# Patient Record
Sex: MALE
[De-identification: names, ages, dates, MRNs, and addresses within clinical notes are randomized per-mention and may not be internally consistent; named-entity substitution may affect disease eponyms.]

---

## 2020-01-01 ENCOUNTER — HOSPITAL ENCOUNTER (INPATIENT)
Dept: HOSPITAL 5 - ED | Age: 75
LOS: 3 days | Discharge: LEFT BEFORE DISCHARGE | DRG: 811 | End: 2020-01-04
Attending: HOSPITALIST | Admitting: INTERNAL MEDICINE
Payer: MEDICARE

## 2020-01-01 DIAGNOSIS — E11.9: ICD-10-CM

## 2020-01-01 DIAGNOSIS — Z91.041: ICD-10-CM

## 2020-01-01 DIAGNOSIS — Z53.29: ICD-10-CM

## 2020-01-01 DIAGNOSIS — Z79.84: ICD-10-CM

## 2020-01-01 DIAGNOSIS — Z79.82: ICD-10-CM

## 2020-01-01 DIAGNOSIS — I50.31: ICD-10-CM

## 2020-01-01 DIAGNOSIS — J90: ICD-10-CM

## 2020-01-01 DIAGNOSIS — D46.9: Primary | ICD-10-CM

## 2020-01-01 DIAGNOSIS — Z82.49: ICD-10-CM

## 2020-01-01 DIAGNOSIS — Z79.899: ICD-10-CM

## 2020-01-01 DIAGNOSIS — D61.818: ICD-10-CM

## 2020-01-01 DIAGNOSIS — F17.210: ICD-10-CM

## 2020-01-01 DIAGNOSIS — Z85.05: ICD-10-CM

## 2020-01-01 DIAGNOSIS — J44.9: ICD-10-CM

## 2020-01-01 DIAGNOSIS — Z79.51: ICD-10-CM

## 2020-01-01 DIAGNOSIS — Z88.5: ICD-10-CM

## 2020-01-01 DIAGNOSIS — K21.9: ICD-10-CM

## 2020-01-01 DIAGNOSIS — Z85.038: ICD-10-CM

## 2020-01-01 PROCEDURE — 93005 ELECTROCARDIOGRAM TRACING: CPT

## 2020-01-01 PROCEDURE — 93010 ELECTROCARDIOGRAM REPORT: CPT

## 2020-01-01 PROCEDURE — 82550 ASSAY OF CK (CPK): CPT

## 2020-01-01 PROCEDURE — 36430 TRANSFUSION BLD/BLD COMPNT: CPT

## 2020-01-01 PROCEDURE — 85025 COMPLETE CBC W/AUTO DIFF WBC: CPT

## 2020-01-01 PROCEDURE — 86900 BLOOD TYPING SEROLOGIC ABO: CPT

## 2020-01-01 PROCEDURE — 85730 THROMBOPLASTIN TIME PARTIAL: CPT

## 2020-01-01 PROCEDURE — 36415 COLL VENOUS BLD VENIPUNCTURE: CPT

## 2020-01-01 PROCEDURE — 82553 CREATINE MB FRACTION: CPT

## 2020-01-01 PROCEDURE — 85007 BL SMEAR W/DIFF WBC COUNT: CPT

## 2020-01-01 PROCEDURE — P9016 RBC LEUKOCYTES REDUCED: HCPCS

## 2020-01-01 PROCEDURE — 80061 LIPID PANEL: CPT

## 2020-01-01 PROCEDURE — 85610 PROTHROMBIN TIME: CPT

## 2020-01-01 PROCEDURE — 83880 ASSAY OF NATRIURETIC PEPTIDE: CPT

## 2020-01-01 PROCEDURE — 80048 BASIC METABOLIC PNL TOTAL CA: CPT

## 2020-01-01 PROCEDURE — 94760 N-INVAS EAR/PLS OXIMETRY 1: CPT

## 2020-01-01 PROCEDURE — 86850 RBC ANTIBODY SCREEN: CPT

## 2020-01-01 PROCEDURE — 94640 AIRWAY INHALATION TREATMENT: CPT

## 2020-01-01 PROCEDURE — 84484 ASSAY OF TROPONIN QUANT: CPT

## 2020-01-01 PROCEDURE — 85027 COMPLETE CBC AUTOMATED: CPT

## 2020-01-01 PROCEDURE — 85379 FIBRIN DEGRADATION QUANT: CPT

## 2020-01-01 PROCEDURE — 86920 COMPATIBILITY TEST SPIN: CPT

## 2020-01-01 PROCEDURE — 86901 BLOOD TYPING SEROLOGIC RH(D): CPT

## 2020-01-01 PROCEDURE — 71045 X-RAY EXAM CHEST 1 VIEW: CPT

## 2020-01-02 LAB
%HYPO/RBC NFR BLD AUTO: (no result) %
ANISOCYTOSIS BLD QL SMEAR: (no result)
APTT BLD: 25.8 SEC. (ref 24.2–36.6)
BAND NEUTROPHILS # (MANUAL): 0 K/MM3
BUN SERPL-MCNC: 28 MG/DL (ref 9–20)
BUN/CREAT SERPL: 20 %
CALCIUM SERPL-MCNC: 8.8 MG/DL (ref 8.4–10.2)
HCT VFR BLD CALC: 16.3 % (ref 35.5–45.6)
HDLC SERPL-MCNC: 44 MG/DL (ref 40–59)
HEMOLYSIS INDEX: 3
HGB BLD-MCNC: 5.4 GM/DL (ref 11.8–15.2)
INR PPP: 1.15 (ref 0.87–1.13)
MCHC RBC AUTO-ENTMCNC: 33 % (ref 32–34)
MCV RBC AUTO: 94 FL (ref 84–94)
MYELOCYTES # (MANUAL): 0 K/MM3
PLATELET # BLD: 67 K/MM3 (ref 140–440)
PROMYELOCYTES # (MANUAL): 0 K/MM3
RBC # BLD AUTO: 1.74 M/MM3 (ref 3.65–5.03)
TOTAL CELLS COUNTED BLD: 100

## 2020-01-02 PROCEDURE — 5A09357 ASSISTANCE WITH RESPIRATORY VENTILATION, LESS THAN 24 CONSECUTIVE HOURS, CONTINUOUS POSITIVE AIRWAY PRESSURE: ICD-10-PCS | Performed by: HOSPITALIST

## 2020-01-02 PROCEDURE — 30233N1 TRANSFUSION OF NONAUTOLOGOUS RED BLOOD CELLS INTO PERIPHERAL VEIN, PERCUTANEOUS APPROACH: ICD-10-PCS | Performed by: HOSPITALIST

## 2020-01-02 RX ADMIN — IPRATROPIUM BROMIDE AND ALBUTEROL SULFATE SCH AMPUL: .5; 3 SOLUTION RESPIRATORY (INHALATION) at 20:31

## 2020-01-02 RX ADMIN — FUROSEMIDE SCH MG: 10 INJECTION, SOLUTION INTRAVENOUS at 09:20

## 2020-01-02 RX ADMIN — Medication SCH ML: at 09:20

## 2020-01-02 RX ADMIN — IPRATROPIUM BROMIDE AND ALBUTEROL SULFATE SCH AMPUL: .5; 3 SOLUTION RESPIRATORY (INHALATION) at 13:38

## 2020-01-02 NOTE — EMERGENCY DEPARTMENT REPORT
ED Shortness of Breath HPI





- General


Chief Complaint: Dyspnea/Respdistress


Stated Complaint: SEAN


Time Seen by Provider: 01/01/20 23:51


Source: patient, EMS


Mode of arrival: Stretcher


Limitations: No Limitations





- History of Present Illness


Initial Comments: 





Patient is a 74-year-old  male with past medical history of 

myelodysplastic syndrome who was recently admitted to the hospital approximately

a month ago for anemia or shortness of breath.  Patient states that he's had 

several episodes where his shortness of breath his laboratories had come to the 

hospital.  Patient denies cough fever and congestion.  Patient states it is 

mostly sensation which improve with supplemental oxygen.  Patient was given neb 

treatment and started on CPAP in route secondary to hypoxia.  Patient states he 

is compliant with his Lasix as he does have episodes of pulmonary edema and 

pleural effusions.  Patient is a blood transfusion on his last hospitalization. 

Patient start chemotherapy next week.





Patient was 82% on 2 L when ems arrived





-: Gradual, This evening


Improves With: oxygen, bronchodilators





- Related Data


                                Home Medications











 Medication  Instructions  Recorded  Confirmed  Last Taken


 


Aspirin EC [Halfprin EC] 81 mg PO QDAY 05/05/16 12/02/19 10 Days Ago





    ~12/17/18


 


Pravastatin Sodium [Pravastatin] 20 mg PO QHS 05/05/16 12/04/19 12/27/18 04:30


 


amLODIPine 5 mg PO DAILY 05/05/16 12/02/19 12/27/18 04:30


 


ALBUTEROL Inhaler(NF) [VENTOLIN 2 puff IH QDAY PRN 12/18/18 12/02/19 Unknown





Inhaler(NF)]    


 


Fluticasone [Flonase] 1 spray NS QDAY 12/18/18 12/04/19 12/26/18


 


Glucosam-Chondro-Herb 149-Hyal 1,500 mg PO BID 12/18/18 12/02/19 10 Days Ago





[Glucosamine-Chondr Complex Tab]    ~12/17/18


 


Multivit-Min/FA/Lycopen/Lutein 1 each PO DAILY 12/18/18 12/04/19 10 Days Ago





[Centrum Silver Tablet]    ~12/17/18


 


Omega-3 Fatty Acids/Fish Oil [Fish 1 each PO DAILY 12/18/18 12/04/19 10 Days Ago





Oil 1,000 mg Capsule]    ~12/17/18


 


Umeclidinium Brm/Vilanterol Tr 1 each IH DAILY 12/18/18 12/04/19 12/27/18 04:30





[Anoro Ellipta 62.5-25 Mcg INH]    


 


Metoprolol Succinate [Toprol Xl] 100 mg PO DAILY 12/02/19 12/04/19 Unknown


 


Multivit-Min/FA/Lycopen/Lutein 1 each PO DAILY 12/02/19 12/04/19 Unknown





[Centrum Silver Tablet]    











                                    Allergies











Allergy/AdvReac Type Severity Reaction Status Date / Time


 


morphine AdvReac  HALLUCINATI Verified 12/18/18 16:36





   ONS  


 


IVP DYE AdvReac  Hives Uncoded 12/18/18 16:36














ED Review of Systems


ROS: 


Stated complaint: SEAN


Other details as noted in HPI





Comment: All other systems reviewed and negative





ED Past Medical Hx





- Past Medical History


Previous Medical History?: Yes


Hx Hypertension: Yes (took antihypertensives this morning)


Hx Heart Attack/AMI: No


Hx Diabetes: Yes (NO MEDS)


Hx GERD: Yes


Hx Liver Disease: Yes (remote hx colon cancer with liver met s/p lobectomy)


Hx Renal Disease: No


Hx Seizures: No


Hx Kidney Stones: Yes


Hx COPD: Yes (daily anoro and prn albuterol)


Hx HIV: No





- Social History


Smoking Status: Never Smoker





- Medications


Home Medications: 


                                Home Medications











 Medication  Instructions  Recorded  Confirmed  Last Taken  Type


 


Aspirin EC [Halfprin EC] 81 mg PO QDAY 05/05/16 12/02/19 10 Days Ago History





    ~12/17/18 


 


Pravastatin Sodium [Pravastatin] 20 mg PO QHS 05/05/16 12/04/19 12/27/18 04:30 

History


 


amLODIPine 5 mg PO DAILY 05/05/16 12/02/19 12/27/18 04:30 History


 


ALBUTEROL Inhaler(NF) [VENTOLIN 2 puff IH QDAY PRN 12/18/18 12/02/19 Unknown 

History





Inhaler(NF)]     


 


Fluticasone [Flonase] 1 spray NS QDAY 12/18/18 12/04/19 12/26/18 History


 


Glucosam-Chondro-Herb 149-Hyal 1,500 mg PO BID 12/18/18 12/02/19 10 Days Ago 

History





[Glucosamine-Chondr Complex Tab]    ~12/17/18 


 


Multivit-Min/FA/Lycopen/Lutein 1 each PO DAILY 12/18/18 12/04/19 10 Days Ago 

History





[Centrum Silver Tablet]    ~12/17/18 


 


Omega-3 Fatty Acids/Fish Oil [Fish 1 each PO DAILY 12/18/18 12/04/19 10 Days Ago

 History





Oil 1,000 mg Capsule]    ~12/17/18 


 


Umeclidinium Brm/Vilanterol Tr 1 each IH DAILY 12/18/18 12/04/19 12/27/18 04:30 

History





[Anoro Ellipta 62.5-25 Mcg INH]     


 


Metoprolol Succinate [Toprol Xl] 100 mg PO DAILY 12/02/19 12/04/19 Unknown 

History


 


Multivit-Min/FA/Lycopen/Lutein 1 each PO DAILY 12/02/19 12/04/19 Unknown History





[Centrum Silver Tablet]     














ED Physical Exam





- General


Limitations: No Limitations


General appearance: alert, in no apparent distress





- Head


Head exam: Present: atraumatic, normocephalic





- Eye


Eye exam: Present: normal appearance, PERRL, EOMI





- ENT


ENT exam: Present: mucous membranes moist





- Neck


Neck exam: Present: normal inspection





- Respiratory


Respiratory exam: Present: wheezes (mild), prolonged expiratory, other (states 

now she's had treatment and is on the Ventimask and feels much improved).  

Absent: normal lung sounds bilaterally, respiratory distress





- Cardiovascular


Cardiovascular Exam: Present: regular rate, normal rhythm, normal heart sounds. 

Absent: systolic murmur, diastolic murmur, rubs, gallop





- GI/Abdominal


GI/Abdominal exam: Present: soft, normal bowel sounds.  Absent: distended, 

tenderness, guarding





- Rectal


Rectal exam: Present: deferred





- Extremities Exam


Extremities exam: Present: normal inspection





- Back Exam


Back exam: Present: normal inspection





- Neurological Exam


Neurological exam: Present: alert, oriented X3





- Psychiatric


Psychiatric exam: Present: normal affect, normal mood





- Skin


Skin exam: Present: warm, dry, intact, normal color.  Absent: rash





ED Course


                                   Vital Signs











  01/01/20





  23:04


 


Temperature 98.2 F


 


Respiratory 22





Rate 


 


O2 Sat by Pulse 98





Oximetry 














ED Medical Decision Making





- Lab Data


Result diagrams: 


                                 01/02/20 00:17





                                 01/02/20 00:17








                                   Lab Results











  01/02/20 01/02/20 01/02/20 Range/Units





  00:17 00:17 00:17 


 


WBC  1.8 L*    (4.5-11.0)  K/mm3


 


RBC  1.74 L    (3.65-5.03)  M/mm3


 


Hgb  5.4 L*    (11.8-15.2)  gm/dl


 


Hct  16.3 L*    (35.5-45.6)  %


 


MCV  94    (84-94)  fl


 


MCH  31    (28-32)  pg


 


MCHC  33    (32-34)  %


 


RDW  23.8 H    (13.2-15.2)  %


 


Plt Count  67 L    (140-440)  K/mm3


 


PT   14.9   (12.2-14.9)  Sec.


 


INR   1.15 H   (0.87-1.13)  


 


APTT   25.8   (24.2-36.6)  Sec.


 


Sodium    140  (137-145)  mmol/L


 


Potassium    3.8  (3.6-5.0)  mmol/L


 


Chloride    100.9  ()  mmol/L


 


Carbon Dioxide    24  (22-30)  mmol/L


 


Anion Gap    19  mmol/L


 


BUN    28 H  (9-20)  mg/dL


 


Creatinine    1.4  (0.8-1.5)  mg/dL


 


Estimated GFR    50  ml/min


 


BUN/Creatinine Ratio    20  %


 


Glucose    183 H  ()  mg/dL


 


Calcium    8.8  (8.4-10.2)  mg/dL


 


NT-Pro-B Natriuret Pep    1658 H  (0-900)  pg/mL














- Radiology Data





CHEST 1 VIEW





INDICATION:


respiratiry distress





COMPARISON:


12/2/2019





FINDINGS:





Support devices: Left PICC line is again in position, but its distal end cannot 

be identified on


this image.





Heart: Normal and unchanged





Lungs/Pleura: Interstitial disease persists unchanged and is probably chronic. 

However, there now


appear to have developed small bilateral pleural effusions.








IMPRESSION:


1. Chronic lung disease. Interval development in the past month of small 

bilateral pleural


effusions.





Signer Name: Jose A Vallejo MD


Signed: 1/2/2020 12:10 AM


Workstation Name: [id number]





- Medical Decision Making





She was hypoxic at home is improved after neb treatment and being placed on a 

Ventimask.  Patient's satting 100% currently.  Patient does have worsening 

anemia and his hemoglobin dropped to 5.4.  Blood transfusion and alert.  Patient

to be admitted to the hospitalist service for further management\


Critical care attestation.: 


If time is entered above; I have spent that time in minutes in the direct care 

of this critically ill patient, excluding procedure time.








ED Disposition


Clinical Impression: 


 Myelodysplastic syndrome, Pancytopenia, Anemia requiring transfusions, 

Symptomatic anemia





Disposition: DC-09 OP ADMIT IP TO THIS HOSP


Is pt being admited?: Yes


Does the pt Need Aspirin: No


Condition: Stable


Time of Disposition: 01:34

## 2020-01-02 NOTE — CONSULTATION
History of Present Illness


Consult date: 01/02/20


Requesting physician: ROXY RECINOS


Consult reason: shortness of breath


History of present illness: 


The patient is followed by Dr. Izaguirre in our office.  He has a history of 

myelodysplastic syndrome.  Yesterday evening, he developed progressive dyspnea. 

H claims that he has had some leg edema as well.  Due to worsening of symptoms, 

he presented to the emergency department where he was noted to have a hemoglobin

of 5.4.  Previously, he was scheduled to see his hematologist, Dr. Beltran, in 

routine follow up this morning.  CXR revealed small bilateral pleural effusions.





Past History


Past Medical History: cancer (h/o Colon CA with liver mets), COPD, diabetes, 

hypertension, hyperlipidemia, other (MDS)


Past Surgical History: Other (hepatic resection)


Social history: denies: smoking, alcohol abuse


Family history: denies: CAD





Medications and Allergies


                                    Allergies











Allergy/AdvReac Type Severity Reaction Status Date / Time


 


morphine AdvReac  HALLUCINATI Verified 12/18/18 16:36





   ONS  


 


IVP DYE AdvReac  Hives Uncoded 12/18/18 16:36











                                Home Medications











 Medication  Instructions  Recorded  Confirmed  Last Taken  Type


 


Aspirin EC [Halfprin EC] 81 mg PO QDAY 05/05/16 01/02/20 10 Days Ago History





    ~12/17/18 


 


amLODIPine 5 mg PO DAILY 05/05/16 01/02/20 01/01/20 10:00 History


 


ALBUTEROL Inhaler(NF) [VENTOLIN 2 puff IH QDAY PRN 12/18/18 01/02/20 Unknown 

History





Inhaler(NF)]     


 


Fluticasone [Flonase] 1 spray NS QDAY 12/18/18 01/02/20 01/01/20 10:00 History


 


Glucosam-Chondro-Herb 149-Hyal 1,500 mg PO BID 12/18/18 01/02/20 10 Days Ago 

History





[Glucosamine-Chondr Complex Tab]    ~12/17/18 


 


Omega-3 Fatty Acids/Fish Oil [Fish 1 each PO DAILY 12/18/18 01/02/20 01/01/20 

10:00 History





Oil 1,000 mg Capsule]     


 


Umeclidinium Brm/Vilanterol Tr 1 each IH DAILY 12/18/18 01/02/20 01/01/20 10:00 

History





[Anoro Ellipta 62.5-25 Mcg INH]     


 


Metoprolol Succinate [Toprol Xl] 100 mg PO DAILY 12/02/19 01/02/20 01/01/20 

10:00 History


 


Multivit-Min/FA/Lycopen/Lutein 1 each PO DAILY 12/02/19 01/02/20 Unknown History





[Centrum Silver Tablet]     


 


Cyanocobalamin (Vitamin B-12) 1,000 mcg IM 4XW 01/02/20 01/02/20 12/25/19 10:00 

History





[Vitamin B-12]     


 


Furosemide [Lasix] 20 mg PO QDAY 01/02/20 01/02/20 01/01/20 10:00 History


 


Pantoprazole [Protonix] 40 mg PO QDAY 01/02/20 01/02/20 01/01/20 10:00 History


 


oxyCODONE /ACETAMINOPHEN [Percocet 1 tab PO Q6HR PRN 01/02/20 01/02/20 Unknown 

History





5/325]     











Active Meds: 


Active Medications





Acetaminophen (Tylenol)  650 mg PO Q4H PRN


   PRN Reason: Pain MILD(1-3)/Fever >100.5/HA


Albuterol (Proventil)  2.5 mg IH Q6HRT PRN


   PRN Reason: Shortness Of Breath


Albuterol/Ipratropium (Duoneb *Not For Prn Use*)  1 ampul IH Q6HRT [facility]


Amlodipine Besylate (Amlodipine)  5 mg PO DAILY [facility]


Fish Oil (Fish Oil)  1,000 mg PO DAILY [facility]


Fluticasone Propionate (Flonase)  50 mcg NS QDAY [facility]


Furosemide (Lasix)  20 mg IV QDAY [facility]


   Last Admin: 01/02/20 09:20 Dose:  20 mg


   Documented by: 


Metoprolol Succinate (Metoprolol Xl)  100 mg PO DAILY [facility]


Miscellaneous Medication (Umeclidinium Brm/Vilanterol Tr [Anoro Ellipta 62.5-25 

Mcg Inh])  1 each IH DAILY [facility]


Ondansetron HCl (Zofran)  4 mg IV Q8H PRN


   PRN Reason: Nausea And Vomiting


Pravastatin Sodium (Pravachol)  20 mg PO QHS [facility]


Sodium Chloride (Sodium Chloride Flush Syringe 10 Ml)  10 ml IV BID [facility]


   Last Admin: 01/02/20 09:20 Dose:  10 ml


   Documented by: 


Sodium Chloride (Sodium Chloride Flush Syringe 10 Ml)  10 ml IV PRN PRN


   PRN Reason: LINE FLUSH











Review of Systems


Constitutional: no fever, no chills


Ears, nose, mouth and throat: no ear pain, no ear discharge, no sore throat


Cardiovascular: shortness of breath, dyspnea on exertion, leg edema, no chest 

pain, no orthopnea


Respiratory: shortness of breath, dyspnea on exertion, no cough, no hemoptysis


Gastrointestinal: no abdominal pain, no nausea, no vomiting, no diarrhea, no 

constipation


Genitourinary Male: no dysuria, no urinary frequency


Rectal: no pain, no bleeding


Musculoskeletal: no neck stiffness, no neck pain, no myalgias


Integumentary: no rash, no pruritis


Neurological: no weakness, no parathesias, no headaches


Endocrine: no cold intolerance, no heat intolerance


Hematologic/Lymphatic: no easy bruising, no easy bleeding


Allergic/Immunologic: no urticaria, no angioedema





Physical Examination


                                   Vital Signs





                                Last Vital Signs











Temp  98.4 F   01/02/20 11:10


 


Pulse  81   01/02/20 11:10


 


Resp  20   01/02/20 11:10


 


BP  118/49   01/02/20 11:10


 


Pulse Ox  100   01/02/20 11:10














General appearance: no acute distress


HEENT: Positive: EOMI, Normocephaly, Mucus Membranes Moist


Neck: Positive: neck supple, trachea midline


Cardiac: Positive: Reg Rate and Rhythm, S1/S2


Lungs: Positive: clear to auscultation


Neuro: Positive: Grossly Intact


Abdomen: Positive: Soft, Active Bowel Sounds.  Negative: Tender


Skin: Positive: Clear.  Negative: Rash


Musculoskeletal: Normal Range of Motion


Extremities: Present: normal.  Absent: edema





Results





                                 01/02/20 00:17





                                 01/02/20 00:17


                                   Coagulation











  01/02/20 Range/Units





  00:17 


 


PT  14.9  (12.2-14.9)  Sec.


 


INR  1.15 H  (0.87-1.13)  


 


APTT  25.8  (24.2-36.6)  Sec.








                                       CBC











  01/02/20 Range/Units





  00:17 


 


WBC  1.8 L*  (4.5-11.0)  K/mm3


 


RBC  1.74 L  (3.65-5.03)  M/mm3


 


Hgb  5.4 L*  (11.8-15.2)  gm/dl


 


Hct  16.3 L*  (35.5-45.6)  %


 


Plt Count  67 L  (140-440)  K/mm3








                          Comprehensive Metabolic Panel











  01/02/20 Range/Units





  00:17 


 


Sodium  140  (137-145)  mmol/L


 


Potassium  3.8  (3.6-5.0)  mmol/L


 


Chloride  100.9  ()  mmol/L


 


Carbon Dioxide  24  (22-30)  mmol/L


 


BUN  28 H  (9-20)  mg/dL


 


Creatinine  1.4  (0.8-1.5)  mg/dL


 


Glucose  183 H  ()  mg/dL


 


Calcium  8.8  (8.4-10.2)  mg/dL














- Imaging and Cardiology


EKG: pending





Assessment and Plan


Intravenous diuretics and PRBC transfusion as you're doing.  He probably 

developed high output heart failure secondary to severe anemia.








- Patient Problems


(1) Symptomatic anemia


Current Visit: Yes   Status: Acute   





(2) Acute heart failure with preserved ejection fraction (HFpEF)


Current Visit: Yes   Status: Acute   





(3) Pancytopenia


Current Visit: Yes   Status: Acute   





(4) Myelodysplastic syndrome


Current Visit: Yes   Status: Chronic   





(5) COPD (chronic obstructive pulmonary disease)


Current Visit: Yes   Status: Chronic   





(6) Hypertension


Current Visit: Yes   Status: Chronic   


Qualifiers: 


   Hypertension type: essential hypertension   Qualified Code(s): I10 - 

Essential (primary) hypertension   





(7) Diabetes mellitus


Current Visit: Yes   Status: Chronic   


Qualifiers: 


   Diabetes mellitus type: type 2

## 2020-01-02 NOTE — XRAY REPORT
CHEST 1 VIEW



INDICATION: 

respiratiry distress



COMPARISON: 

12/2/2019



FINDINGS:



Support devices: Left PICC line is again in position, but its distal end cannot be identified on this
 image.



Heart: Normal and unchanged 



Lungs/Pleura: Interstitial disease persists unchanged and is probably chronic. However, there now wai
ear to have developed small bilateral pleural effusions.  





IMPRESSION:

1. Chronic lung disease. Interval development in the past month of small bilateral pleural effusions.




Signer Name: Jose A Vallejo MD 

Signed: 1/2/2020 12:10 AM

 Workstation Name: [id number]

## 2020-01-02 NOTE — EVENT NOTE
Date: 01/02/20


Patient seen and examined reports mild improvement close to his baseline.  He 

would rather go home but he is knows that he still short of breath.  He reports 

that he has had decreased exercise tolerance in the past few months.  He is 

agreeable to be monitored overnight and if continues to be stable and at his 

home dose of oxygen therapy he can be discharged.


He thinks that he is supposed to be on Lasix 40 mg at home but apparently has 

only been taking 20 mg he will follow-up with Dr. Jaffe and Dr. Contreras's nephrology and also pulmonologist respectively for reconciliation of 

this medicines.

## 2020-01-02 NOTE — HISTORY AND PHYSICAL REPORT
History of Present Illness


History of present illness: 


74-year-old man with a history of myelodysplastic syndrome diagnosed 3 months 

ago, COPD, hypertension, GERD, liver disease, colon cancer with mets comes 

emergency room with complaints of shortness of breath that started tonight.  

Also complains of fatigue, generalized pain which is chronic.  The patient has 

an appointment tomorrow to meet with his oncologist to discuss treatment for 

myelodysplastic syndrome.  Since he was diagnosed he is been transfused twice 

for anemia.  He has chronic lower extremity srinivas managed on Lasix ,patient is 

being admitted to be transfused


Review of systems


Constitutional: no  weight loss, chills, fever


Ears, eyes, nose, mouth and throat: no nasal congestion, no nasal discharge, no 

sinus pressure, no vision change, no red eye.


Neck: No neck pain or rigidity.


Cardiovascular: no chest pain, palpitations


Respiratory: no cough


Gastrointestinal: no hematochezia, abdominal pain


Genitourinary : no  frequency , no hematuria


Musculoskeletal: no joint swelling or muscle ache 


Integumentary: no rash, no pruritis


Neurological: no parathesias, no numbness, no focal weakness


Endocrine: no cold or heat intolerance, no polyuria or polydipsia


Hematologic/Lymphatic: no easy bruising, no easy bleeding, no gland swelling


Allergic/Immunologic: no urticaria, no angioedema.





PAST MEDICAL HISTORY: Hypertension, myelodysplastic syndrome diagnosed 3 months 

ago, COPD, hypertension, GERD, liver disease, colon cancer with mets 





PAST SURGICAL HISTORY: Part of the colon removed with right lobe of the liver





SOCIAL HISTORY: No alcohol, no drugs, smoke 1 pack a day





FAMILY HISTORY: Hypertension








Medications and Allergies


                                    Allergies











Allergy/AdvReac Type Severity Reaction Status Date / Time


 


morphine AdvReac  HALLUCINATI Verified 12/18/18 16:36





   ONS  


 


IVP DYE AdvReac  Hives Uncoded 12/18/18 16:36











                                Home Medications











 Medication  Instructions  Recorded  Confirmed  Last Taken  Type


 


Aspirin EC [Halfprin EC] 81 mg PO QDAY 05/05/16 01/02/20 10 Days Ago History





    ~12/17/18 


 


amLODIPine 5 mg PO DAILY 05/05/16 01/02/20 01/01/20 10:00 History


 


ALBUTEROL Inhaler(NF) [VENTOLIN 2 puff IH QDAY PRN 12/18/18 01/02/20 Unknown 

History





Inhaler(NF)]     


 


Fluticasone [Flonase] 1 spray NS QDAY 12/18/18 01/02/20 01/01/20 10:00 History


 


Glucosam-Chondro-Herb 149-Hyal 1,500 mg PO BID 12/18/18 01/02/20 10 Days Ago 

History





[Glucosamine-Chondr Complex Tab]    ~12/17/18 


 


Omega-3 Fatty Acids/Fish Oil [Fish 1 each PO DAILY 12/18/18 01/02/20 01/01/20 

10:00 History





Oil 1,000 mg Capsule]     


 


Umeclidinium Brm/Vilanterol Tr 1 each IH DAILY 12/18/18 01/02/20 01/01/20 10:00 

History





[Anoro Ellipta 62.5-25 Mcg INH]     


 


Metoprolol Succinate [Toprol Xl] 100 mg PO DAILY 12/02/19 01/02/20 01/01/20 

10:00 History


 


Multivit-Min/FA/Lycopen/Lutein 1 each PO DAILY 12/02/19 01/02/20 Unknown History





[Centrum Silver Tablet]     


 


Cyanocobalamin (Vitamin B-12) 1,000 mcg IM 4XW 01/02/20 01/02/20 12/25/19 10:00 

History





[Vitamin B-12]     


 


Furosemide [Lasix] 20 mg PO QDAY 01/02/20 01/02/20 01/01/20 10:00 History


 


Pantoprazole [Protonix] 40 mg PO QDAY 01/02/20 01/02/20 01/01/20 10:00 History


 


oxyCODONE /ACETAMINOPHEN [Percocet 1 tab PO Q6HR PRN 01/02/20 01/02/20 Unknown 

History





5/325]     











Active Meds: 


Active Medications





Acetaminophen (Tylenol)  650 mg PO Q4H PRN


   PRN Reason: Pain MILD(1-3)/Fever >100.5/HA


Sodium Chloride (Nacl 0.9% 500 Ml)  500 mls @ 0 mls/hr IV ONCE ONE


   Stop: 01/02/20 02:40


Ondansetron HCl (Zofran)  4 mg IV Q8H PRN


   PRN Reason: Nausea And Vomiting


Sodium Chloride (Sodium Chloride Flush Syringe 10 Ml)  10 ml IV BID [facility]US


Sodium Chloride (Sodium Chloride Flush Syringe 10 Ml)  10 ml IV PRN PRN


   PRN Reason: LINE FLUSH











Exam





- Physical Exam


Narrative exam: 


General Apperance: The patient lying in bed,  breathing comfortable 


HEENT: Normocephalic, atraumatic.  Pupils equally round and reactive to light, 

EOMI, no sclericterus or JVD or thyromegaly or nodule.  , no carotid bruit, 

mucous membranes moist, no exudate or erythema


Heart: S1-S2, regular is rhythm


Lungs: Decrease breath sound at the bases bilaterally, breathing comfortable


Abdomen: Positive bowel sounds, soft, nontender, nondistended, no organomegaly


Extremities:+ edema, no  cyanosis clubbing


Skin: no rash, nodule, warm and dry


Neuro: cranial nerves 2-12 intact, speech is fluent, motor/sensory intact














- Constitutional


Vitals: 


                                        











Temp Pulse Resp BP Pulse Ox


 


 98.2 F   77   15   112/47   100 


 


 01/01/20 23:04  01/02/20 02:30  01/02/20 02:30  01/02/20 02:30  01/02/20 02:30














Results





- Labs


CBC & Chem 7: 


                                 01/04/20 04:42





                                 01/03/20 08:50


Labs: 


                              Abnormal lab results











  01/02/20 01/02/20 01/02/20 Range/Units





  00:17 00:17 00:17 


 


WBC  1.8 L*    (4.5-11.0)  K/mm3


 


RBC  1.74 L    (3.65-5.03)  M/mm3


 


Hgb  5.4 L*    (11.8-15.2)  gm/dl


 


Hct  16.3 L*    (35.5-45.6)  %


 


RDW  23.8 H    (13.2-15.2)  %


 


Plt Count  67 L    (140-440)  K/mm3


 


INR   1.15 H   (0.87-1.13)  


 


BUN    28 H  (9-20)  mg/dL


 


Glucose    183 H  ()  mg/dL


 


NT-Pro-B Natriuret Pep    1658 H  (0-900)  pg/mL














Assessment and Plan


Assessment


Anemia secondary to myelodysplastic syndrome 


transfuse packed red blood cells


scheduled to see his oncologist tomorrow to discuss treatment





Shortness of breath multifactorial, anemia, pleural effusion


Start Lasix, check cardiac echo, cardiac enzymes





COPD,stable





colon cancer with mets 





Addendum


Nurse called to see the patient was having shortness of breath


Give a dose of steroids, Lasix, placed on BiPAP, check d-dimer


Family state that he had an echo done last week at [facility]


Results unknown, consult cardiology


Hold off on echo for now


Symptoms improving

## 2020-01-03 LAB
ANISOCYTOSIS BLD QL SMEAR: (no result)
BAND NEUTROPHILS # (MANUAL): 0 K/MM3
BUN SERPL-MCNC: 28 MG/DL (ref 9–20)
BUN/CREAT SERPL: 25 %
CALCIUM SERPL-MCNC: 8.5 MG/DL (ref 8.4–10.2)
HCT VFR BLD CALC: 17.9 % (ref 35.5–45.6)
HEMOLYSIS INDEX: 8
HGB BLD-MCNC: 6 GM/DL (ref 11.8–15.2)
MACROCYTES BLD QL SMEAR: (no result)
MCHC RBC AUTO-ENTMCNC: 33 % (ref 32–34)
MCV RBC AUTO: 93 FL (ref 84–94)
MYELOCYTES # (MANUAL): 0 K/MM3
PLATELET # BLD: 51 K/MM3 (ref 140–440)
PROMYELOCYTES # (MANUAL): 0 K/MM3
RBC # BLD AUTO: 1.93 M/MM3 (ref 3.65–5.03)
TOTAL CELLS COUNTED BLD: 100

## 2020-01-03 RX ADMIN — Medication SCH: at 17:54

## 2020-01-03 RX ADMIN — FUROSEMIDE SCH MG: 10 INJECTION, SOLUTION INTRAVENOUS at 09:41

## 2020-01-03 RX ADMIN — IPRATROPIUM BROMIDE AND ALBUTEROL SULFATE SCH AMPUL: .5; 3 SOLUTION RESPIRATORY (INHALATION) at 22:12

## 2020-01-03 RX ADMIN — IPRATROPIUM BROMIDE AND ALBUTEROL SULFATE SCH AMPUL: .5; 3 SOLUTION RESPIRATORY (INHALATION) at 15:26

## 2020-01-03 RX ADMIN — Medication SCH ML: at 21:49

## 2020-01-03 RX ADMIN — Medication SCH ML: at 09:42

## 2020-01-03 RX ADMIN — PRAVASTATIN SODIUM SCH: 20 TABLET ORAL at 07:15

## 2020-01-03 RX ADMIN — OMEGA-3 FATTY ACIDS CAP 1000 MG SCH MG: 1000 CAP at 09:41

## 2020-01-03 RX ADMIN — IPRATROPIUM BROMIDE AND ALBUTEROL SULFATE SCH: .5; 3 SOLUTION RESPIRATORY (INHALATION) at 10:17

## 2020-01-03 RX ADMIN — FLUTICASONE PROPIONATE SCH MCG: 50 SPRAY, METERED NASAL at 09:41

## 2020-01-03 RX ADMIN — METOPROLOL SUCCINATE SCH MG: 100 TABLET, EXTENDED RELEASE ORAL at 09:41

## 2020-01-03 RX ADMIN — IPRATROPIUM BROMIDE AND ALBUTEROL SULFATE SCH AMPUL: .5; 3 SOLUTION RESPIRATORY (INHALATION) at 03:11

## 2020-01-03 RX ADMIN — PRAVASTATIN SODIUM SCH MG: 20 TABLET ORAL at 21:50

## 2020-01-03 NOTE — HEM/ONC PROGRESS NOTE
Assessment and Plan





1.  History of myelodysplastic syndrome, anemia.  The patient was on Procrit.  

The patient is due to start chemotherapy.


2.  History of colon cancer with mets in the liver.  This was about 20 years


ago.


3.  Anemia secondary to myelodysplastic syndrome.


4.  Leukopenia secondary to myelodysplastic syndrome.


5.  Thrombocytopenia secondary to myelodysplastic syndrome.


6.  On oxygen support.


7.  History of chronic obstructive pulmonary disease.


8.  History of hypertension.


9.  Mention of liver disease.


10.  I will follow the patient during inpatient stay.  He would need transfusion

support and once discharged, hopefully can start chemotherapy, the coming week, 

with Dr. Beltran.








- Patient Problems


(1) Myelodysplastic syndrome


Status: Chronic   





Subjective


Date of service: 01/03/20


Principal diagnosis: MDS


Interval history: 





s/p PRBC





Objective





- Exam


Narrative Exam: 





Pain - pt on o2


General appearance -  awake


Performance status limited self care


Eyes - no icterus


ENT  - no bleeding





LNs  cervical  not palpable


Neck  - no LN





Respiratory  Normal - on o2


Breath sounds - CTA anteriorly





CVS  S1 S2 +


Extremities nil acute


General GI  Soft


Rectal  deferred


male  - deferred


Skin  warm 


Musculoskeletal - awake


Neurologically awake, answers q





- Constitutional


Vitals: 


                                Last Vital Signs











Temp  98.2 F   01/03/20 02:23


 


Pulse  88   01/03/20 03:12


 


Resp  16   01/03/20 03:12


 


BP  104/48   01/03/20 02:23


 


Pulse Ox  98   01/03/20 02:23














- Labs


Lab Results: 


                         Laboratory Results - last 24 hr











  01/02/20 01/02/20 01/02/20





  01:18 18:23 18:23


 


D-Dimer    192.60


 


Total Creatine Kinase   48 L 


 


CK-MB (CK-2)   4.2 H 


 


CK-MB (CK-2) Rel Index   8.7 H 


 


Troponin T   0.047 H 


 


Triglycerides   90 


 


Cholesterol   104 


 


LDL Cholesterol Direct   58 


 


HDL Cholesterol   44 


 


Cholesterol/HDL Ratio   2.36 


 


Blood Type  O POSITIVE  


 


Antibody Screen  Negative  


 


Crossmatch  See Detail  














Medications & Allergies





- Medications


Allergies/Adverse Reactions: 


                                    Allergies





morphine Adverse Reaction (Verified 12/18/18 16:36)


   HALLUCINATIONS


IVP DYE Adverse Reaction (Uncoded 12/18/18 16:36)


   Hives








Home Medications: 


                                Home Medications











 Medication  Instructions  Recorded  Confirmed  Last Taken  Type


 


Aspirin EC [Halfprin EC] 81 mg PO QDAY 05/05/16 01/02/20 10 Days Ago History





    ~12/17/18 


 


amLODIPine 5 mg PO DAILY 05/05/16 01/02/20 01/01/20 10:00 History


 


ALBUTEROL Inhaler(NF) [VENTOLIN 2 puff IH QDAY PRN 12/18/18 01/02/20 Unknown 

History





Inhaler(NF)]     


 


Fluticasone [Flonase] 1 spray NS QDAY 12/18/18 01/02/20 01/01/20 10:00 History


 


Glucosam-Chondro-Herb 149-Hyal 1,500 mg PO BID 12/18/18 01/02/20 10 Days Ago 

History





[Glucosamine-Chondr Complex Tab]    ~12/17/18 


 


Omega-3 Fatty Acids/Fish Oil [Fish 1 each PO DAILY 12/18/18 01/02/20 01/01/20 

10:00 History





Oil 1,000 mg Capsule]     


 


Umeclidinium Brm/Vilanterol Tr 1 each IH DAILY 12/18/18 01/02/20 01/01/20 10:00 

History





[Anoro Ellipta 62.5-25 Mcg INH]     


 


Metoprolol Succinate [Toprol Xl] 100 mg PO DAILY 12/02/19 01/02/20 01/01/20 

10:00 History


 


Multivit-Min/FA/Lycopen/Lutein 1 each PO DAILY 12/02/19 01/02/20 Unknown History





[Centrum Silver Tablet]     


 


Cyanocobalamin (Vitamin B-12) 1,000 mcg IM 4XW 01/02/20 01/02/20 12/25/19 10:00 

History





[Vitamin B-12]     


 


Furosemide [Lasix] 20 mg PO QDAY 01/02/20 01/02/20 01/01/20 10:00 History


 


Pantoprazole [Protonix] 40 mg PO QDAY 01/02/20 01/02/20 01/01/20 10:00 History


 


oxyCODONE /ACETAMINOPHEN [Percocet 1 tab PO Q6HR PRN 01/02/20 01/02/20 Unknown 

History





5/325]     











Active Medications: 














Generic Name Dose Route Start Last Admin





  Trade Name Freq  PRN Reason Stop Dose Admin


 


Acetaminophen  650 mg  01/02/20 02:42 





  Tylenol  PO  





  Q4H PRN  





  Pain MILD(1-3)/Fever >100.5/HA  


 


Albuterol  2.5 mg  01/02/20 11:05 





  Proventil  IH  





  Q6HRT PRN  





  Shortness Of Breath  


 


Albuterol/Ipratropium  1 ampul  01/02/20 14:00  01/03/20 03:11





  Duoneb *Not For Prn Use*  IH   1 ampul





  Q6HRT LINUS   Administration


 


Amlodipine Besylate  5 mg  01/03/20 10:00 





  Amlodipine  PO  





  DAILY [facility], [facility]1983  


 


Fish Oil  1,000 mg  01/03/20 10:00 





  Fish Oil  PO  





  DAILY [facility]  


 


Fluticasone Propionate  50 mcg  01/03/20 10:00 





  Flonase  NS  





  QDAY LINUS  


 


Furosemide  20 mg  01/02/20 10:00  01/02/20 09:20





  Lasix  IV   20 mg





  QDAY [facility]   Administration


 


Metoprolol Succinate  100 mg  01/03/20 10:00 





  Metoprolol Xl  PO  





  DAILY [facility]  


 


Miscellaneous Medication  1 each  01/03/20 10:00 





  Umeclidinium Brm/Vilanterol Tr [Anoro Ellipta 62.5-25 Mcg Inh]  IH  





  DAILY [facility]  


 


Ondansetron HCl  4 mg  01/02/20 02:42 





  Zofran  IV  





  Q8H PRN  





  Nausea And Vomiting  


 


Pravastatin Sodium  20 mg  01/02/20 22:00  01/03/20 07:15





  Pravachol  PO   Not Given





  QHS [facility]  


 


Sodium Chloride  10 ml  01/02/20 10:00  01/02/20 09:20





  Sodium Chloride Flush Syringe 10 Ml  IV   10 ml





  BID LINUS   Administration


 


Sodium Chloride  10 ml  01/02/20 02:42 





  Sodium Chloride Flush Syringe 10 Ml  IV  





  PRN PRN  





  LINE FLUSH

## 2020-01-03 NOTE — PROGRESS NOTE
Assessment and Plan





1.  History of myelodysplastic syndrome, anemia.  The patient was on Procrit. 


The patient is due to start chemotherapy as an outpatient with Dr. Beltran


2.  History of colon cancer with mets in the liver.  This was about 20 years


ago.


3.  Symptomatic Anemia secondary to myelodysplastic syndrome.


     At this post PRBC transfusion.  Repeat hemoglobin and hematocrit this 

morning is still critically low and needs further       transfusion


4.  Leukopenia secondary to myelodysplastic syndrome.


5.  Thrombocytopenia secondary to myelodysplastic syndrome.


6.  Continue On oxygen support.


7.  History of chronic obstructive pulmonary disease.-Not in exacerbation.  He 

is a current smoker


8.  History of hypertension.-Well-controlled


9.  Acute congestive heart failure with preserved ejection fraction


     Continue Diuretic.  Cardiology note reviewed


       We'll discharge tomorrow if there is further improvement in his 

hemoglobin





Subjective


Date of service: 01/03/20


Principal diagnosis: anemia


Interval history: 





Patient is alert and oriented and wants to go home


He states that his breathing has improved


He offers no specific complaints


He denies any chest pain dizziness palpitations and does complain of shortness 

of breath with exertion


Nuys any fever or chills dysuria nausea or abdominal pain


All interdisciplinary notes reviewed


Explained to the patient that he is still severely anemic and needs further 

blood transfusion


Blood transfusion has been ordered by the hematologist





Objective





- Constitutional


Vitals: 


                               Vital Signs - 12hr











  01/03/20 01/03/20 01/03/20





  02:23 03:12 07:26


 


Temperature 98.2 F  98.5 F


 


Pulse Rate 95 H  89


 


Pulse Rate [  88 





Anterior   





Bilateral]   


 


Respiratory 18  18





Rate   


 


Respiratory  16 





Rate [Anterior   





Bilateral]   


 


Blood Pressure   122/48


 


Blood Pressure 104/48  





[Right]   


 


O2 Sat by Pulse 98  100





Oximetry   











General appearance: Present: no acute distress





- EENT


Eyes: PERRL, EOM intact


ENT: hearing intact, clear oral mucosa





- Neck


Neck: supple, normal ROM, no masses or JVD





- Respiratory


Respiratory effort: normal


Respiratory: bilateral: CTA, diminished





- Cardiovascular


Rhythm: regular


Heart Sounds: Present: S1 & S2


Extremity abnormal: edema





- Gastrointestinal


General gastrointestinal: Present: soft, non-tender


Rectal Exam: deferred





- Genitourinary


Male genitourinary: deferred





- Integumentary


Integumentary: clear





- Musculoskeletal


Musculoskeletal: strength equal bilaterally





- Neurologic


Neurologic: CNII-XII intact, no focal deficits





- Psychiatric


Psychiatric: appropriate mood/affect





- Labs


CBC & Chem 7: 


                                 01/03/20 08:50





                                 01/03/20 08:50


Labs: 


                              Abnormal lab results











  01/02/20 01/02/20 01/03/20 Range/Units





  01:18 18:23 08:50 


 


WBC    2.0 L  (4.5-11.0)  K/mm3


 


RBC    1.93 L  (3.65-5.03)  M/mm3


 


Hgb    6.0 L  (11.8-15.2)  gm/dl


 


Hct    17.9 L*  (35.5-45.6)  %


 


RDW    17.8 H  (13.2-15.2)  %


 


Plt Count    51 L  (140-440)  K/mm3


 


Seg Neutrophils # Man    1.4 L  (1.8-7.7)  K/mm3


 


Lymphocytes # (Manual)    0.6 L  (1.2-5.4)  K/mm3


 


BUN     (9-20)  mg/dL


 


Glucose     ()  mg/dL


 


Total Creatine Kinase   48 L   ()  units/L


 


CK-MB (CK-2)   4.2 H   (0.0-4.0)  ng/mL


 


CK-MB (CK-2) Rel Index   8.7 H   (0-4)  


 


Troponin T   0.047 H   (0.00-0.029)  ng/mL


 


Crossmatch  See Detail    














  01/03/20 Range/Units





  08:50 


 


WBC   (4.5-11.0)  K/mm3


 


RBC   (3.65-5.03)  M/mm3


 


Hgb   (11.8-15.2)  gm/dl


 


Hct   (35.5-45.6)  %


 


RDW   (13.2-15.2)  %


 


Plt Count   (140-440)  K/mm3


 


Seg Neutrophils # Man   (1.8-7.7)  K/mm3


 


Lymphocytes # (Manual)   (1.2-5.4)  K/mm3


 


BUN  28 H  (9-20)  mg/dL


 


Glucose  166 H  ()  mg/dL


 


Total Creatine Kinase   ()  units/L


 


CK-MB (CK-2)   (0.0-4.0)  ng/mL


 


CK-MB (CK-2) Rel Index   (0-4)  


 


Troponin T   (0.00-0.029)  ng/mL


 


Crossmatch

## 2020-01-03 NOTE — PROGRESS NOTE
Assessment and Plan


Intravenous diuretics and PRBC transfusion as you're doing.  He probably 

developed high output heart failure secondary to severe anemia.


F/u hematology recs.


Pending hematology recs, pt may discharge home from cardiology standpoint. At 

discharge, recommend conversion of IV lasix to PO lasix 20mg daily. 


Recommend follow up in our office with Dr. Izaguirre within 3-5 days of discharge 

([phone]). 





The patient has been seen in conjunction with Dr. Schwarz who agrees with the 

assessment and plan of care. 





- Patient Problems


(1) Symptomatic anemia


Current Visit: Yes   Status: Acute   





(2) Acute heart failure with preserved ejection fraction (HFpEF)


Current Visit: Yes   Status: Acute   





(3) Pancytopenia


Current Visit: Yes   Status: Acute   





(4) Myelodysplastic syndrome


Current Visit: Yes   Status: Chronic   





(5) COPD (chronic obstructive pulmonary disease)


Current Visit: Yes   Status: Chronic   





(6) Hypertension


Current Visit: Yes   Status: Chronic   


Qualifiers: 


   Hypertension type: essential hypertension   Qualified Code(s): I10 - 

Essential (primary) hypertension   





(7) Diabetes mellitus


Current Visit: Yes   Status: Chronic   


Qualifiers: 


   Diabetes mellitus type: type 2 








Subjective


Date of service: 01/03/20


Principal diagnosis: anemia


Interval history: 


pt resting in bed, states he is feeling much better today. in SR HR 100s on 

tele.








Objective





                                Last Vital Signs











Temp  98.5 F   01/03/20 07:26


 


Pulse  89   01/03/20 07:26


 


Resp  18   01/03/20 07:26


 


BP  122/48   01/03/20 07:26


 


Pulse Ox  100   01/03/20 07:26

















- Physical Examination


General: No Apparent Distress


HEENT: Positive: EOMI, Normocephaly, Mucus Membranes Moist


Neck: Positive: neck supple, trachea midline


Cardiac: Positive: Reg Rate and Rhythm, S1/S2


Lungs: Positive: Decreased Breath Sounds


Neuro: Positive: Grossly Intact


Abdomen: Positive: Soft, Active Bowel Sounds.  Negative: Tender


Skin: Positive: Clear.  Negative: Rash


Musculoskeletal: Normal Range of Motion


Extremities: Present: normal.  Absent: edema





- Labs and Meds


                                 Cardiac Enzymes











  01/02/20 Range/Units





  18:23 


 


CK-MB (CK-2)  4.2 H  (0.0-4.0)  ng/mL








                                     Lipids











  01/02/20 Range/Units





  18:23 


 


Triglycerides  90  (2-149)  mg/dL


 


Cholesterol  104  ()  mg/dL


 


HDL Cholesterol  44  (40-59)  mg/dL


 


Cholesterol/HDL Ratio  2.36  %








                                       CBC











  01/03/20 Range/Units





  08:50 


 


WBC  2.0 L  (4.5-11.0)  K/mm3


 


RBC  1.93 L  (3.65-5.03)  M/mm3


 


Hgb  6.0 L  (11.8-15.2)  gm/dl


 


Hct  17.9 L*  (35.5-45.6)  %


 


Plt Count  51 L  (140-440)  K/mm3








                          Comprehensive Metabolic Panel











  01/03/20 Range/Units





  08:50 


 


Sodium  142  (137-145)  mmol/L


 


Potassium  3.7  (3.6-5.0)  mmol/L


 


Chloride  101.3  ()  mmol/L


 


Carbon Dioxide  25  (22-30)  mmol/L


 


BUN  28 H  (9-20)  mg/dL


 


Creatinine  1.1  (0.8-1.5)  mg/dL


 


Glucose  166 H  ()  mg/dL


 


Calcium  8.5  (8.4-10.2)  mg/dL














- Imaging and Cardiology


EKG: pending

## 2020-01-03 NOTE — CONSULTATION
REFERRED BY:  Dr. Sutton.



REASON FOR CONSULTATION:  Anemia, MDS.



HISTORY OF PRESENT ILLNESS:  I saw the patient, a 74-year-old male in the

medical floor.  I had seen the patient in early 12/2019.  He has history of

myelodysplastic syndrome for which he follows Dr. Beltran.  He was found to be

unresponsive in December.  At that time, he was found to be anemic, oxygen

support was given.  He was discharged with a plan to start chemotherapy.  Since

discharge, the patient said that Procrit has been used, but he is due to start

the chemotherapy, the delay may have been because of the holidays.  He came to

the hospital because of shortness of breath, fatigue, generalized pain.  He has

history of lower leg swelling in the recent past.  He has been admitted a few

times for transfusion support.  At this time, he is on oxygen support.  He is

feeling better compared to admission.  No headache, no visual disturbances, no

ear discharge.  No vomiting, no diarrhea, no dysuria.  No hematemesis, no

hematochezia.



PAST MEDICAL HISTORY:  Hypertension, MDS and COPD and then GERD, liver disease,

history of colon cancer many years ago.



SOCIAL HISTORY:  History of smoking in the past.



FAMILY HISTORY:  Noncontributory.



ALLERGIES:  IV DYE.



Past medical history as above.  His colon cancer was more than 20 years ago. 

Transfusion support has been ordered.



PHYSICAL EXAMINATION:

VITAL SIGNS:  Temperature 98, pulse 90, respirations 18, /49.

HEENT:  Pallor present, no icterus.

NECK:  No neck lymph nodes.

HEART:  S1, S2.

LUNGS:  Clear to auscultation.  Decreased air entry.

ABDOMEN:  Soft.  The patient on oxygen support.

EXTREMITIES:  No calf tenderness.



LABORATORY DATA:  White cell 1.8, hemoglobin 5.4, MCV 94, platelets 67. 

Potassium 3.8, creatinine 1.4, calcium 8.8.



RADIOLOGY:  Chest x-ray was done.



ASSESSMENT AND PLAN:

1.  History of myelodysplastic syndrome, anemia.  The patient was on Procrit. 

The patient is due to start chemotherapy.

2.  History of colon cancer with mets in the liver.  This was about 20 years

ago.

3.  Anemia secondary to myelodysplastic syndrome.

4.  Leukopenia secondary to myelodysplastic syndrome.

5.  Thrombocytopenia secondary to myelodysplastic syndrome.

6.  On oxygen support.

7.  History of chronic obstructive pulmonary disease.

8.  History of hypertension.

9.  Mention of liver disease.

10.  I will follow the patient during inpatient stay.  He would need transfusion

support and once discharged, hopefully can start chemotherapy, the coming week,

with Dr. Beltran.





DD: 01/02/2020 22:13

DT: 01/03/2020 02:19

JOB# [id number]  [id number]

NM/NTS

## 2020-01-04 VITALS — SYSTOLIC BLOOD PRESSURE: 129 MMHG | DIASTOLIC BLOOD PRESSURE: 39 MMHG

## 2020-01-04 LAB
HCT VFR BLD CALC: 18.2 % (ref 35.5–45.6)
HGB BLD-MCNC: 5.9 GM/DL (ref 11.8–15.2)
MCHC RBC AUTO-ENTMCNC: 33 % (ref 32–34)
MCV RBC AUTO: 92 FL (ref 84–94)
PLATELET # BLD: 45 K/MM3 (ref 140–440)
RBC # BLD AUTO: 1.98 M/MM3 (ref 3.65–5.03)

## 2020-01-04 RX ADMIN — FUROSEMIDE SCH: 10 INJECTION, SOLUTION INTRAVENOUS at 10:07

## 2020-01-04 RX ADMIN — IPRATROPIUM BROMIDE AND ALBUTEROL SULFATE SCH: .5; 3 SOLUTION RESPIRATORY (INHALATION) at 03:07

## 2020-01-04 RX ADMIN — FLUTICASONE PROPIONATE SCH MCG: 50 SPRAY, METERED NASAL at 10:14

## 2020-01-04 RX ADMIN — OMEGA-3 FATTY ACIDS CAP 1000 MG SCH MG: 1000 CAP at 10:05

## 2020-01-04 RX ADMIN — METOPROLOL SUCCINATE SCH: 100 TABLET, EXTENDED RELEASE ORAL at 10:07

## 2020-01-04 RX ADMIN — Medication SCH ML: at 10:15

## 2020-01-04 RX ADMIN — IPRATROPIUM BROMIDE AND ALBUTEROL SULFATE SCH AMPUL: .5; 3 SOLUTION RESPIRATORY (INHALATION) at 09:23

## 2020-01-04 NOTE — DISCHARGE SUMMARY
Providers





- Providers


Date of Admission: 


01/02/20 03:16





Date of discharge: 01/04/20 (AMA)


Attending physician: 


FARHAT DURANT





                                        





01/02/20 05:52


Consult to Physician [CONS] Routine 


   Comment: adonay leyva/ eleoonra


   Consulting Provider: EVIN LONDON


   Physician Instructions: 


   Reason For Exam: sob/LE edema





01/02/20 08:08


Consult to Physician [CONS] Routine 


   Comment: migdalia parker/ kaylene


   Consulting Provider: MUSA NELSON


   Physician Instructions: 


   Reason For Exam: mds





01/02/20 13:52


Physical Therapy Evaluation and Treat [CONS] Routine 


   Comment: 


   Reason For Exam: Weakness











Primary care physician: 


RAYNA MELTON








Hospitalization


Reason for admission: symptomatic anemia


Condition: Poor


Hospital course: 





74-year-old male with history of recently diagnosed myelodysplastic syndrome, 

COPD and a current smoker, GERD, history of colon cancer with mets was seen in 

the emergency room for increasing shortness of breath and was found to be 

severely anemic and admitted for further management.  He had multiple blood 

transfusions during his hospitalization and patient wanted to be discharged 

yesterday but because of his hemoglobin being 6.0 , I ordered 1 unit of blood 

transfusion and this morning, posttransfusion hemoglobin was 5.9.  I had a long 

discussion with the patient his wife and his daughter as patient was insisting 

on being discharged and see his oncologist in the clinic.  I was willing to 

discharge him if he is accepting to get 2 more units of blood prior to his 

discharge as his hemoglobin was critically low and I explained to him the 

dangers of low hemoglobin including MI and loss of consciousness.  Patient 

refused any further transfusions and he signed out AGAINST MEDICAL ADVICE


1.  History of myelodysplastic syndrome, anemia.  The patient was on Procrit. 


The patient is due to start chemotherapy as an outpatient with Dr. Beltran


2.  History of colon cancer with mets in the liver.  This was about 20 years


ago.


3.  Symptomatic Anemia secondary to myelodysplastic syndrome.


     At this post PRBC transfusion.  Repeat hemoglobin and hematocrit this 

morning is still critically low and needs further       transfusion


4.  Leukopenia secondary to myelodysplastic syndrome.


5.  Thrombocytopenia secondary to myelodysplastic syndrome.


6.  Continue On oxygen support.


7.  History of chronic obstructive pulmonary disease.-Not in exacerbation.  He 

is a current smoker


8.  History of hypertension.-Well-controlled


9.  Acute congestive heart failure with preserved ejection fraction


Cardiology has been following during his hospitalization


Disposition: DC-07 LEFT AGAINST MED ADVICE





Core Measure Documentation





- Palliative Care


Palliative Care/ Comfort Measures: Not Applicable





- Core Measures


Any of the following diagnoses?: heart failure





- Heart Failure Discharge Requirements


ACE/ARB for LVSD if EF <40%: Not Applicable


Reason for no ACE/ARB: Patient refusal


Beta blocker at discharge: Yes





Exam





- Constitutional


Vitals: 


                                        











Temp Pulse Resp BP Pulse Ox


 


 97.5 F L  89   20   129/39   97 


 


 01/04/20 08:50  01/04/20 10:07  01/04/20 08:50  01/04/20 10:07  01/04/20 08:50











General appearance: Present: no acute distress





- EENT


Eyes: Present: PERRL, EOM intact


ENT: hearing intact, clear oral mucosa





- Neck


Neck: Present: supple, normal ROM





- Respiratory


Respiratory effort: normal


Respiratory: bilateral: CTA, diminished





- Cardiovascular


Rhythm: regular


Heart Sounds: Present: S1 & S2





- Extremities


Extremity abnormal: edema





- Abdominal


General gastrointestinal: Present: soft, non-tender


Male genitourinary: Present: deferred





- Rectal


Rectal Exam: deferred, stool brown





- Integumentary


Integumentary: Present: clear





- Musculoskeletal


Musculoskeletal: strength equal bilaterally





- Psychiatric


Psychiatric: appropriate mood/affect





- Neurologic


Neurologic: no focal deficits





Plan


Activity: advance as tolerated


Weight Bearing Status: Weight Bear as Tolerated


Diet: regular, low fat, low salt


Follow up with: 


[facility], MD [Referring] - 3-5 Days

## 2020-01-04 NOTE — EVENT NOTE
Date: 01/04/20





hgb 5.9 this am ( trending down from 6.0 yesterday 1/3/20) will order 2U PRBC. 

Follow up on post transfusion labs

## 2020-01-20 ENCOUNTER — HOSPITAL ENCOUNTER (EMERGENCY)
Dept: HOSPITAL 5 - ED | Age: 75
End: 2020-01-20
Payer: MEDICARE

## 2020-01-20 DIAGNOSIS — I50.9: ICD-10-CM

## 2020-01-20 DIAGNOSIS — I11.0: ICD-10-CM

## 2020-01-20 DIAGNOSIS — E11.9: ICD-10-CM

## 2020-01-20 DIAGNOSIS — Z87.442: ICD-10-CM

## 2020-01-20 DIAGNOSIS — Z88.8: ICD-10-CM

## 2020-01-20 DIAGNOSIS — I46.9: Primary | ICD-10-CM

## 2020-01-20 DIAGNOSIS — Z79.899: ICD-10-CM

## 2020-01-20 DIAGNOSIS — J44.9: ICD-10-CM

## 2020-01-20 DIAGNOSIS — K21.9: ICD-10-CM

## 2020-01-20 DIAGNOSIS — Z91.041: ICD-10-CM

## 2020-01-20 PROCEDURE — 92950 HEART/LUNG RESUSCITATION CPR: CPT

## 2020-01-20 PROCEDURE — 99285 EMERGENCY DEPT VISIT HI MDM: CPT

## 2020-01-20 NOTE — EMERGENCY DEPARTMENT REPORT
ED CPR HPI





- General


Stated Complaint: CARDIAC ARREST


Time Seen by Provider: 20 00:52


Source: EMS, old records reviewed


Mode of arrival: Stretcher


Limitations: Other (cardiac arrest)





- History of Present Illness


Initial Comments: 





Mr. Short is a 75 yo male with hx of anemia, CHF, COPD, CKD, Atrial 

fibrillation, HTN, DM who presents in cardiorespiratory arrest.  His wife found 

Mr. Short sitting upright unresponsive.  She called 911 with the notion that

her  was dead.  Upon arrival, wife did not desire CPR.  Paramedics 

proceeded to perform ACLS without the presence of a living will or advanced 

directive.  Paramedics treated Mr. Waters with lizandro airway insertion, 

epinephrine.  Initial rhythm asystole developed to PEA.





Mr. Short was recently discharged one week ago according to EMS report from 

wife.


MD Complaint: found unresponsive


Place: home


Bystander CPR Performed: No


Initial Findings in the Field: unresponsive, other rhythm (asystole)


ROSC in the Field: No


Associated Injuries: No


Treatments Prior to Arrival: other airway device (Lizandro airway), epinephrine mgs 

# (3 doses of epinephrine)





- Related Data


                                Home Medications











 Medication  Instructions  Recorded  Confirmed  Last Taken


 


Aspirin EC [Halfprin EC] 81 mg PO QDAY 16 10 Days Ago





    ~18


 


amLODIPine 5 mg PO DAILY 16 10:00


 


ALBUTEROL Inhaler(NF) [VENTOLIN 2 puff IH QDAY PRN 18 Unknown





Inhaler(NF)]    


 


Fluticasone [Flonase] 1 spray NS QDAY 18 10:00


 


Glucosam-Chondro-Herb 149-Hyal 1,500 mg PO BID 18 10 Days Ago





[Glucosamine-Chondr Complex Tab]    ~18


 


Omega-3 Fatty Acids/Fish Oil [Fish 1 each PO DAILY 18 

10:00





Oil 1,000 mg Capsule]    


 


Umeclidinium Brm/Vilanterol Tr 1 each IH DAILY 18 10:00





[Anoro Ellipta 62.5-25 Mcg INH]    


 


Metoprolol Succinate [Toprol Xl] 100 mg PO DAILY 19 10

:00


 


Multivit-Min/FA/Lycopen/Lutein 1 each PO DAILY 19 Unknown





[Centrum Silver Tablet]    


 


Cyanocobalamin (Vitamin B-12) 1,000 mcg IM 4XW 20 10:00





[Vitamin B-12]    


 


Furosemide [Lasix] 20 mg PO QDAY 20 10:00


 


Pantoprazole [Protonix] 40 mg PO QDAY 20 10:00


 


oxyCODONE /ACETAMINOPHEN [Percocet 1 tab PO Q6HR PRN 20 Unknown





5/325]    











                                    Allergies











Allergy/AdvReac Type Severity Reaction Status Date / Time


 


morphine AdvReac  HALLUCINATI Verified 18 16:36





   ONS  


 


IVP DYE AdvReac  Hives Uncoded 18 16:36














ED Review of Systems


ROS: 


Stated complaint: CARDIAC ARREST


Other details as noted in HPI





Comment: Unobtainable due to pts medical conditions (cardiac arrest)





ED Past Medical Hx





- Past Medical History


Previous Medical History?: Yes


Hx Hypertension: Yes


Hx Heart Attack/AMI: No


Hx Diabetes: Yes (NO MEDS)


Hx GERD: Yes


Hx Liver Disease: Yes (remote hx colon cancer with liver met s/p lobectomy)


Hx Renal Disease: No


Hx Seizures: No


Hx Kidney Stones: Yes


Hx COPD: Yes


Hx HIV: No





- Social History


Smoking Status: Never Smoker





- Medications


Home Medications: 


                                Home Medications











 Medication  Instructions  Recorded  Confirmed  Last Taken  Type


 


Aspirin EC [Halfprin EC] 81 mg PO QDAY 16 10 Days Ago History





    ~18 


 


amLODIPine 5 mg PO DAILY 16 10:00 History


 


ALBUTEROL Inhaler(NF) [VENTOLIN 2 puff IH QDAY PRN 18 Unknown 

History





Inhaler(NF)]     


 


Fluticasone [Flonase] 1 spray NS QDAY 18 10:00 History


 


Glucosam-Chondro-Herb 149-Hyal 1,500 mg PO BID 18 10 Days Ago 

History





[Glucosamine-Chondr Complex Tab]    ~18 


 


Omega-3 Fatty Acids/Fish Oil [Fish 1 each PO DAILY 18 

10:00 History





Oil 1,000 mg Capsule]     


 


Umeclidinium Brm/Vilanterol Tr 1 each IH DAILY 18 10:00 

History





[Anoro Ellipta 62.5-25 Mcg INH]     


 


Metoprolol Succinate [Toprol Xl] 100 mg PO DAILY 19 

10:00 History


 


Multivit-Min/FA/Lycopen/Lutein 1 each PO DAILY 19 Unknown History





[Centrum Silver Tablet]     


 


Cyanocobalamin (Vitamin B-12) 1,000 mcg IM 4XW 20 10:00 

History





[Vitamin B-12]     


 


Furosemide [Lasix] 20 mg PO QDAY 20 10:00 History


 


Pantoprazole [Protonix] 40 mg PO QDAY 20 10:00 History


 


oxyCODONE /ACETAMINOPHEN [Percocet 1 tab PO Q6HR PRN 20 Unknown 

History





5/325]     














ED Physical Exam





- General


Limitations: Other (cardiac arrest)


General appearance: other (pale lifeless spontaneousmovementpale lifeless no 

spontaneous movement)





- Head


Head exam: Present: atraumatic, normocephalic





- Eye


Eye exam: Present: other (fixed dilated).  Absent: scleral icterus, conjunctival

injection





- ENT


ENT exam: Present: other (pale dry mucous membranes)





- Neck


Neck exam: Present: normal inspection





- Respiratory


Respiratory exam: Present: other (coarse equal breath sounds with ventilation no

spontaneous breathing)





- Cardiovascular


Cardiovascular Exam: Present: other (no ausculated heart sounds or lung sounds 

no carotid pulse present)





- GI/Abdominal


GI/Abdominal exam: Present: soft, other (numerous old surgical scars).  Absent: 

distended





- Extremities Exam


Extremities exam: Present: other (no trauma)





- Neurological Exam


Neurological exam: Present: other (lifeless)





- Psychiatric


Psychiatric exam: Present: other (lifeless)





- Skin


Skin exam: Present: pallor





ED Medical Decision Making





- Medical Decision Making





Mr. Short presented in cardiac arrest.  Presented to the ED after 40 minutes

of resuscitation ACLS per EMS.  No return of spontaneous circulation had been 

achieved.  We continued chest compressions upon arrival.  For 5 seconds, PEA 

present on cardiac monitor which rapid deteriorated to asystole.  TIme of death 

0045.  Family members informed in person upon arrival.


Critical care attestation.: 


If time is entered above; I have spent that time in minutes in the direct care 

of this critically ill patient, excluding procedure time.








ED Disposition


Clinical Impression: 


 Cardiac arrest, CHF (congestive heart failure), COPD (chronic obstructive pu

lmonary disease)





Disposition: DC-20 


Is pt being admited?: No


Does the pt Need Aspirin: No


Time of Disposition: 00:45